# Patient Record
Sex: MALE | Race: WHITE | HISPANIC OR LATINO | Employment: FULL TIME | URBAN - METROPOLITAN AREA
[De-identification: names, ages, dates, MRNs, and addresses within clinical notes are randomized per-mention and may not be internally consistent; named-entity substitution may affect disease eponyms.]

---

## 2018-12-26 ENCOUNTER — TRANSFERRED RECORDS (OUTPATIENT)
Dept: HEALTH INFORMATION MANAGEMENT | Facility: CLINIC | Age: 38
End: 2018-12-26

## 2021-05-25 ENCOUNTER — TRANSFERRED RECORDS (OUTPATIENT)
Dept: HEALTH INFORMATION MANAGEMENT | Facility: CLINIC | Age: 41
End: 2021-05-25

## 2021-12-13 ENCOUNTER — TRANSFERRED RECORDS (OUTPATIENT)
Dept: HEALTH INFORMATION MANAGEMENT | Facility: CLINIC | Age: 41
End: 2021-12-13

## 2022-02-14 ENCOUNTER — TRANSFERRED RECORDS (OUTPATIENT)
Dept: HEALTH INFORMATION MANAGEMENT | Facility: CLINIC | Age: 42
End: 2022-02-14

## 2022-09-13 ENCOUNTER — TRANSFERRED RECORDS (OUTPATIENT)
Dept: HEALTH INFORMATION MANAGEMENT | Facility: CLINIC | Age: 42
End: 2022-09-13

## 2022-10-20 ENCOUNTER — TRANSCRIBE ORDERS (OUTPATIENT)
Dept: OTHER | Age: 42
End: 2022-10-20

## 2022-10-20 DIAGNOSIS — K60.30 ANAL FISTULA: Primary | ICD-10-CM

## 2022-10-31 NOTE — TELEPHONE ENCOUNTER
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 Gipson  11/26/18 - GEN SURG OV with Dr. Velasco  11/16/17 - GEN SURG OV with Dr. Hu    [facility]:  9/13/22, 6/21/22 - CR OV with Dr. Felipe  12/31/19 - CR OV with YANDEL Garcia   DISCHARGE SUMMARY from hospital Care Everywhere [facility]:  11/16/16 - Admission with Dr. Hernandez   DISCHARGE REPORT from the ER N/A    OPERATIVE REPORT Received / Care Everywhere [facility]:  2/14/22 - OP Note for LAY OPEN FISTULOTOMY with Dr. Matteo Hernandez:  12/29/22 - OP Note for FISTULA EXAM with Dr. Felipe  5/25/21 - OP Note for DEBRIDEMENT OF FISTULOTOMY with Dr. Spencer [facility]:  8/23/18 - OP Note for EXCISIONAL DEBRIDEMENT OF CHRONIC PERIRECTAL ABSCESS CAVITY with Dr. Velasco   MEDICATION LIST Care Everywhere    LABS     BIOPSIES/PATHOLOGY RELATED TO DIAGNOSIS Care Everywhere [facility]:  8/23/18 - Anal Biopsy (Case: [id number])   DIAGNOSTIC PROCEDURES N/A    IMAGING (DISC & REPORT)      MRI Received [facility]:  11/28/22 - MRI Pelvis  7/8/22 - MRI Pelvis/Rectum  1/11/22 - MRI Pelvis    [facility]:  11/27/18 - MRI Pelvis     Records Requested  10/31/22    Facility  [facility]  Fax: [phone]  [facility]  Fax: [phone]  [facility]  Fax: [phone]   Outcome * 10/31/22 1:35 PM Faxed req to [facility], [facility], and [facility] for images and records to be sent to the clinic. - Ruth    * 11/1/22 10:48 AM Records received from [facility] and sent to [facility] to be scanned into the chart.  Images received from [facility] and attached to the patient in PACs. - Ruth    * 11/22/22 7:53 AM Images received from [facility] and attached to the patient in PACs. - Ruth    * 1/6/23 7:14 AM Faxed urg req to [facility] for additional MRI to be pushed to [place] [facility]. -  Ruth    * 1/6/23 2:02 PM Images received from [facility] and attached to the patient in PACs. - Ruth

## 2023-02-06 ENCOUNTER — PRE VISIT (OUTPATIENT)
Dept: SURGERY | Facility: CLINIC | Age: 43
End: 2023-02-06